# Patient Record
Sex: FEMALE | Race: WHITE
[De-identification: names, ages, dates, MRNs, and addresses within clinical notes are randomized per-mention and may not be internally consistent; named-entity substitution may affect disease eponyms.]

---

## 2017-04-26 ENCOUNTER — HOSPITAL ENCOUNTER (OUTPATIENT)
Dept: HOSPITAL 80 - FSGY | Age: 42
Discharge: HOME | End: 2017-04-26
Attending: ORTHOPAEDIC SURGERY
Payer: COMMERCIAL

## 2017-04-26 DIAGNOSIS — M65.862: ICD-10-CM

## 2017-04-26 DIAGNOSIS — M22.42: Primary | ICD-10-CM

## 2017-04-26 PROCEDURE — 0SBD4ZZ EXCISION OF LEFT KNEE JOINT, PERCUTANEOUS ENDOSCOPIC APPROACH: ICD-10-PCS | Performed by: ORTHOPAEDIC SURGERY

## 2017-04-26 NOTE — GOP
[f 
rep st]



                                                                OPERATIVE REPORT





DATE OF OPERATION:  04/26/2017



SURGEON:  Good Batres MD



ASSISTANT:  Asaf Casey PA-C.



ANESTHESIA:  General.



ANESTHESIOLOGIST:  Dr. Mar.



PREOPERATIVE DIAGNOSIS:  Left knee persistent medial compartment pain, 
consistent with medial meniscus tear or chondral defect.



POSTOPERATIVE DIAGNOSIS:  

1.  Left knee medial femoral condyle full-thickness chondral defect.

2.  Left knee patellar chondromalacia.

3.  Left knee intra-articular synovitis with infrapatellar and suprapatellar 
plica.



PROCEDURE PERFORMED:  

1.  Left knee arthroscopic microfracture procedure of medial femoral condyle 
chondral defect.

2.  Left knee arthroscopic patellar chondroplasty.

3.  Left knee arthroscopic synovectomy with ablation of fat pad, infra and 
suprapatellar plica.



FINDINGS:  EUA was with stable knee throughout.  ROM of -3 to 140.  No 
effusion. 



Arthroscopic findings:

1.  Suprapatellar pouch was clear of any loose bodies.  Moderate synovitis.  
Medial and lateral-based suprapatellar plica.  Gutters clear.  No loose bodies.

2.  Patellofemoral joint with pristine trochlear chondral surface.  The median 
ridge of the patella was notable for approximately 10 x 10 area of grade 2 
chondromalacia.

3.  Notch was with grade 1 infrapatellar plica and normal ACL, PCL.  Fat pad 
was somewhat hypertrophic and synovitic.

4.  Lateral compartment was normal, with only very mild chondromalacia and a 
small area of fraying of the lateral meniscus.  Normal popliteus tendon.  
Chondral surfaces otherwise normal.

5.  Medial compartment was with normal medial meniscus.  There was a 10 x 15 mm
, that is medial to lateral by anterior to posterior, full-thickness chondral 
defect with a large chondral flap.  The remainder of the chondral surfaces were 
normal throughout.

6.  Posteromedial and posterolateral compartments were with intact meniscal 
roots, no loose bodies.



INDICATIONS:  A 41-year-old female, who has failed greater than 1 year of 
nonoperative care for her left knee.  She initially presented to me with medial 
compartment pain and has had persistent medial compartment pain as her primary 
complaint.  Due to cardiac condition and indwelling pacemaker, the patient has 
not been eligible for an MRI.  After failing extensive and exhaustive 
conservative care, she elected to proceed with left knee diagnostic arthroscopy 
to both diagnose and treat her knee condition.  The risks, benefits, and 
alternatives were discussed with the patient.  She received preoperative 
cardiac and medical clearance with perioperative planning.  The risks, benefits
, and alternatives of the surgery were described to the patient.  She provided 
a signed and witnessed consent, which was placed in her chart.  Please see 
history and physical for additional information.



DESCRIPTION OF PROCEDURE:  The patient was identified in the preoperative 
holding area and her left knee was signed and designated as the operative site.
  She was confirmed in right lower extremity DYLAN hose and SCDs.  She was 
treated with 2 g IV prophylactic cefazolin per protocol.  She was taken back to 
the operating room, placed supine on the OR table, and general anesthesia was 
obtained.  The patient had her right lower extremity placed in a well-padded 
leg chan.  Left lower extremity was wrapped proximally with cast padding and 
nonsterile tourniquet, and then prepped and draped in the usual sterile manner. 



Standard anterolateral and anteromedial scope portals were established with a #
11 blade.  Complete diagnostic arthroscopy was performed with findings listed 
above.  The infrapatellar plica and fat pad were debrided with both a full-
radius shaver and the ArthroCare wand in order to facilitate viewing and 
working from both portals, as well as eliminate the synovitis in this portion 
of the knee, particularly the prominent fat pad and synovitis just anterior to 
the medial compartment.



Focus was placed on the patient's medial femoral condyle chondral defect.  This 
was probed and the defect was found to be full-thickness, large, medially based
, unstable and delaminated.  The full-radius shaver and basket resection tools 
were used to carefully debride and remove the unstable and torn segments of the 
chondral surface.  Once this was completed, varying angle curettes was used 
through both portals to establish a well-shouldered border circumferentially 
around the edges of the lesion.  The curette was used to debride the calcified 
cartilage layer down to subchondral bone.  Once the lesion was prepared 
throughout, standard microfracture technique was performed.  Next, multiple 
pick holes were placed at appropriate intervals within the lesion, working from 
the periphery to the center portion of the lesion, using the 2Checkout picks.  
Pump pressure was dropped to assure appropriate cancellous bleeding and fat 
droplets from each of the microfracture sites.  Once this work was completed, 
the knee was placed in figure-4 position. 



Using the full-radius shaver, the small areas of chondromalacia and/or synovial 
proliferation were resected back to a stable base.  Once this was completed, 
the knee was placed in full extension on the OR table. 



The ArthroCare wand was then advanced up into the suprapatellar pouch.  The 2 
plica extensions of the capsule were divided and ablated out to the level of 
the capsule medially and laterally.  Next, using the full-radius shaver, the 
patellar chondromalacia was treated with standard chondroplasty technique, with 
resection back to a stable base.  Only the unstable chondral flap segments were 
resected back and great care was taken to avoid any deepening of the lesion.  
Once this work was completed, the knee was swept throughout to assure 
appropriate hemostasis with drop in pump pressure. 



Once all work was completed throughout the knee, the excess saline was 
evacuated from the knee.  The portals were closed with interrupted 3-0 nylon 
sutures.  Sterile postoperative surgical dressings were applied.  A DYLAN hose 
was applied.  The anesthesia service then took over to wake her up.



TOURNIQUET TIME:  None.



DRAINS:  None.



SPECIMENS:  None.



ESTIMATED BLOOD LOSS:  Minimal.



IMPLANTS:  None.



COMPLICATIONS:  None.



DISPOSITION:  The patient was extubated and transferred to the PACU in stable 
condition.  She will remain NWB LLE and use CPM 6-8 hours/day per microfracture 
protocol for 6 weeks post-op.





Job #:  [id number]

MTDD

## 2017-04-26 NOTE — POSTOPPROG
Post Op Note


Date of Operation: 04/26/17


Surgeon: Asaf Casey


Assistant: Conrad Casey


Anesthesia: GET(General Endotracheal)


Pre-op Diagnosis: Left knee persistent medial compartment pain


Post-op Diagnosis: Left knee patellar chondromalacia, MFC chondral defect, 

synovitis


Procedure: Left knee arthroscopy, synovectomy, chondroplasty, MFC microfracture


Findings: As above, please see full dictation for details.


Inf/Abcess present in the surg proc area at time of surgery?: No


Depth: Deep Incisional (Fascial)


EBL: Minimal

## 2018-12-26 ENCOUNTER — HOSPITAL ENCOUNTER (OUTPATIENT)
Dept: HOSPITAL 80 - FIMAGING | Age: 43
Discharge: HOME | End: 2018-12-26
Attending: PHYSICIAN ASSISTANT
Payer: COMMERCIAL

## 2018-12-26 DIAGNOSIS — R10.11: Primary | ICD-10-CM

## 2018-12-26 DIAGNOSIS — Z90.49: ICD-10-CM

## 2018-12-26 DIAGNOSIS — K83.8: ICD-10-CM
